# Patient Record
Sex: MALE | Race: WHITE | NOT HISPANIC OR LATINO | Employment: FULL TIME | URBAN - METROPOLITAN AREA
[De-identification: names, ages, dates, MRNs, and addresses within clinical notes are randomized per-mention and may not be internally consistent; named-entity substitution may affect disease eponyms.]

---

## 2018-10-18 ENCOUNTER — HISTORICAL (OUTPATIENT)
Dept: RADIOLOGY | Facility: HOSPITAL | Age: 33
End: 2018-10-18

## 2018-12-05 ENCOUNTER — HISTORICAL (OUTPATIENT)
Dept: ADMINISTRATIVE | Facility: HOSPITAL | Age: 33
End: 2018-12-05

## 2018-12-05 LAB
APTT PPP: 33.6 SECOND(S) (ref 24.8–36.9)
INR PPP: 1 (ref 0–1.3)
PROTHROMBIN TIME: 13.6 SECOND(S) (ref 12.2–14.7)

## 2019-04-02 ENCOUNTER — HISTORICAL (OUTPATIENT)
Dept: LAB | Facility: HOSPITAL | Age: 34
End: 2019-04-02

## 2019-04-02 LAB
ABS NEUT (OLG): 3.03 X10(3)/MCL (ref 2.1–9.2)
BASOPHILS # BLD AUTO: 0.1 X10(3)/MCL (ref 0–0.2)
BASOPHILS NFR BLD AUTO: 1 %
EOSINOPHIL # BLD AUTO: 0.2 X10(3)/MCL (ref 0–0.9)
EOSINOPHIL NFR BLD AUTO: 3 %
ERYTHROCYTE [DISTWIDTH] IN BLOOD BY AUTOMATED COUNT: 13.9 % (ref 11.5–17)
HCT VFR BLD AUTO: 48.1 % (ref 42–52)
HGB BLD-MCNC: 16 GM/DL (ref 14–18)
LYMPHOCYTES # BLD AUTO: 2.1 X10(3)/MCL (ref 0.6–4.6)
LYMPHOCYTES NFR BLD AUTO: 36 %
MAGNESIUM SERPL-MCNC: 2 MG/DL (ref 1.8–2.4)
MCH RBC QN AUTO: 28.7 PG (ref 27–31)
MCHC RBC AUTO-ENTMCNC: 33.3 GM/DL (ref 33–36)
MCV RBC AUTO: 86.4 FL (ref 80–94)
MONOCYTES # BLD AUTO: 0.4 X10(3)/MCL (ref 0.1–1.3)
MONOCYTES NFR BLD AUTO: 8 %
NEUTROPHILS # BLD AUTO: 3.03 X10(3)/MCL (ref 2.1–9.2)
NEUTROPHILS NFR BLD AUTO: 52 %
PLATELET # BLD AUTO: 252 X10(3)/MCL (ref 130–400)
PMV BLD AUTO: 9.8 FL (ref 9.4–12.4)
RBC # BLD AUTO: 5.57 X10(6)/MCL (ref 4.7–6.1)
VIT B12 SERPL-MCNC: 622 PG/ML (ref 193–986)
WBC # SPEC AUTO: 5.8 X10(3)/MCL (ref 4.5–11.5)

## 2021-03-15 ENCOUNTER — HISTORICAL (OUTPATIENT)
Dept: ADMINISTRATIVE | Facility: HOSPITAL | Age: 36
End: 2021-03-15

## 2021-03-15 LAB
CK MB SERPL-MCNC: 1.6 NG/ML
CK SERPL-CCNC: 135 UNIT/L (ref 21–232)
TROPONIN I SERPL-MCNC: <0.01 NG/ML (ref 0–0.04)

## 2021-08-20 ENCOUNTER — HISTORICAL (OUTPATIENT)
Dept: ADMINISTRATIVE | Facility: HOSPITAL | Age: 36
End: 2021-08-20

## 2021-08-20 LAB — SARS-COV-2 RNA RESP QL NAA+PROBE: NEGATIVE

## 2022-04-10 ENCOUNTER — HISTORICAL (OUTPATIENT)
Dept: ADMINISTRATIVE | Facility: HOSPITAL | Age: 37
End: 2022-04-10

## 2022-04-27 VITALS
SYSTOLIC BLOOD PRESSURE: 120 MMHG | BODY MASS INDEX: 28.97 KG/M2 | WEIGHT: 225.75 LBS | DIASTOLIC BLOOD PRESSURE: 82 MMHG | HEIGHT: 74 IN | OXYGEN SATURATION: 98 %

## 2022-05-03 NOTE — HISTORICAL [facility]
This is a historical note converted from Cm. Formatting and pictures may have been removed.  Please reference Cm for original formatting and attached multimedia. Chief Complaint  CHEST PAIN LEFT SIDED.  History of Present Illness  Patient is here today with complaints of left-sided?chest?discomfort/tightness?that started on Saturday.? He reports that he was loading?a hold into a truck?and went to throw it when he felt a sharp discomfort?in his?left?chest wall area.? He is felt this sharp discomfort in the past?and just repositioned?and through the hog into the truck.? Since that time?he has continued to have?chest tightness?possibly minimal?shortness of breath.? No palpitations. ?No history of?heart disease.? No change in?intensity of pain with movement or position.? No radiation to neck or arm.? He does have a history of reflux?but this is always felt?different?than current symptoms.  Review of Systems  GENERAL:?no? fatigue,  RESP:?+minimal ?SOB,? ?no?cough,?  CARDIAC:? ?+? chest pain as HPI,? ?no? palpations, nonexertional, no change with position or movement  GI:? ?no? N&V,? ?no? abd pain, + hx Gerd but usually feels different  NEURO:? ?no? headache,? ?no? dizziness  Physical Exam  Vitals & Measurements  T:?37.0? ?C (Oral)? HR:?62(Peripheral)? BP:?126/80?  HT:?187.00?cm? WT:?101.000?kg? BMI:?28.88?  General: NAD  HEENT: WNL  Chest CTAB, Minimal reproducible pain with palpation just superior to nipple  Cardiac RRR  ABD: soft NT/ND,  Assessment/Plan  Chest pain?R07.9  EKG-NSR, CXR, Cardiac enzymes-- ER precautions given--if workup negative then recommend Aleve 2 bid, if fails to improve in 1 week then?pt will call for referral to cardiology.  Ordered:  Clinic Follow-up PRN, 03/15/21 16:20:00 CDT, [facility] - [facility], Future Order  Creatine Kinase, Routine collect, 03/15/21 16:13:00 CDT, Blood, Stop date 03/15/21 16:13:00 CDT, Lab Collect, Chest pain, 03/15/21 16:13:00 CDT  Creatine Kinase MB, Timed collect,  03/15/21 16:04:00 CDT, Blood, Order for future visit, q6hr for 3 dose(s), Stop date 03/16/21 10:59:00 CDT, Lab Collect, Chest pain, 03/15/21 17:00:00 CDT  Office/Outpatient Visit Level 4 Established [id number] PC, Chest pain, [facility] - [facility], 03/15/21 16:20:00 CDT  Troponin-I, Timed collect, 03/15/21 16:04:00 CDT, Blood, Order for future visit, q6hr for 3 dose(s), Stop date 03/16/21 10:59:00 CDT, Lab Collect, Chest pain, 03/15/21 17:00:00 CDT  XR Chest 2 Views, Routine, 03/15/21 16:03:00 CDT, Other (please specify), None, Ambulatory, Rad Type, Chest pain, [facility], 03/15/21 16:03:00 CDT, Not Scheduled  ?  Referrals  Clinic Follow-up PRN, 03/15/21 16:20:00 CDT, [facility] - [facility], Future Order   Problem List/Past Medical History  Ongoing  ADD - Attention deficit disorder  Chronic GERD  Dysphagia  Morbid obesity  Right shoulder pain  Sinusitis  Type 1 von Willebrand disease  Historical  Allergy  Asthma  Hemorrhoids  VUR - Vesicoureteric reflux  Procedure/Surgical History  [place] teeth removed   Medications  Adderall 30 mg oral tablet, 30 mg= 1 tab(s), Oral, BID  Adderall 30 mg oral tablet, 30 mg= 1 tab(s), Oral, BID  Adderall 30 mg oral tablet, 30 mg= 1 tab(s), Oral, BID  omeprazole 40 mg oral DR capsule, 40 mg= 1 cap(s), Oral, Daily, 3 refills  Allergies  amoxicillin?(Rash)  Social History  Abuse/Neglect  No, 03/15/2021  No, 02/05/2021  No, 09/30/2020  No, 09/08/2020  No, 06/30/2020  Alcohol  Current, Beer, Liquor, 1-2 times per week, 02/27/2018  Employment/School  Employed, Previous employment/school: BUILDER ., 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  Home/Environment  Lives with Children., 05/18/2018  Tobacco  Smoker, current status unknown, N/A, 03/15/2021  Smoker, current status unknown, N/A, 02/05/2021  Smoker, current status unknown, Oral, No, 09/30/2020  Smoker, current status unknown, Oral, N/A, 09/08/2020  Smoker, current status unknown, Oral, N/A, 06/30/2020  Family History  Family history is  negative  Immunizations  Vaccine Date Status   influenza virus vaccine, inactivated 09/30/2020 Given   influenza virus vaccine, inactivated 11/26/2019 Given   influenza virus vaccine, inactivated 09/20/2018 Given   tetanus/diphth/pertuss (Tdap) adult/adol 03/23/2015 Recorded   Health Maintenance  Health Maintenance  ???Pending?(in the next year)  ??? ??OverDue  ??? ? ? ?Depression Screening due??12/19/19??and every 1??year(s)  ??? ? ? ?Influenza Vaccine due??10/01/20??and every 1??day(s)  ??? ? ? ?Smoking Cessation due??01/01/21??Variable frequency  ??? ? ? ?Alcohol Misuse Screening due??01/02/21??and every 1??year(s)  ??? ??Due?  ??? ? ? ?ADL Screening due??03/15/21??and every 1??year(s)  ??? ??Due In Future?  ??? ? ? ?Obesity Screening not due until??01/01/22??and every 1??year(s)  ???Satisfied?(in the past 1 year)  ??? ??Satisfied?  ??? ? ? ?Blood Pressure Screening on??03/15/21.??Satisfied by Lizbeth Martinez CMA  ??? ? ? ?Body Mass Index Check on??03/15/21.??Satisfied by Lizbeth Matrinez CMA  ??? ? ? ?Influenza Vaccine on??09/30/20.??Satisfied by Lizbeth Martinez CMA  ??? ? ? ?Obesity Screening on??03/15/21.??Satisfied by Lizbeth Martinez CMA  ?

## 2022-05-04 ENCOUNTER — HOSPITAL ENCOUNTER (OUTPATIENT)
Dept: RADIOLOGY | Facility: HOSPITAL | Age: 37
Discharge: HOME | End: 2022-05-04
Attending: OTOLARYNGOLOGY
Payer: COMMERCIAL

## 2022-05-04 DIAGNOSIS — J34.9 SINUS DISORDER: ICD-10-CM

## 2022-05-04 DIAGNOSIS — Z01.818 OTHER SPECIFIED PRE-OPERATIVE EXAMINATION: Primary | ICD-10-CM

## 2022-05-04 PROCEDURE — 71046 X-RAY EXAM CHEST 2 VIEWS: CPT | Mod: TC

## 2022-05-11 ENCOUNTER — ANESTHESIA EVENT (OUTPATIENT)
Dept: SURGERY | Facility: HOSPITAL | Age: 37
End: 2022-05-11
Payer: COMMERCIAL

## 2022-05-11 RX ORDER — AMPHETAMINE 18.8 MG/1
TABLET, ORALLY DISINTEGRATING ORAL DAILY
COMMUNITY

## 2022-05-11 RX ORDER — OMEPRAZOLE 20 MG/1
20 CAPSULE, DELAYED RELEASE ORAL DAILY
COMMUNITY
End: 2022-12-12 | Stop reason: SDUPTHER

## 2022-05-12 ENCOUNTER — HOSPITAL ENCOUNTER (OUTPATIENT)
Facility: HOSPITAL | Age: 37
Discharge: HOME | End: 2022-05-12
Attending: OTOLARYNGOLOGY | Admitting: OTOLARYNGOLOGY
Payer: COMMERCIAL

## 2022-05-12 ENCOUNTER — ANESTHESIA (OUTPATIENT)
Dept: SURGERY | Facility: HOSPITAL | Age: 37
End: 2022-05-12
Payer: COMMERCIAL

## 2022-05-12 VITALS
RESPIRATION RATE: 18 BRPM | BODY MASS INDEX: 27.55 KG/M2 | HEIGHT: 73 IN | WEIGHT: 207.88 LBS | TEMPERATURE: 98 F | SYSTOLIC BLOOD PRESSURE: 134 MMHG | HEART RATE: 99 BPM | DIASTOLIC BLOOD PRESSURE: 75 MMHG | OXYGEN SATURATION: 98 %

## 2022-05-12 DIAGNOSIS — J32.8 OTHER CHRONIC SINUSITIS: ICD-10-CM

## 2022-05-12 LAB
GROUP & RH: NORMAL
INDIRECT COOMBS GEL: NORMAL

## 2022-05-12 PROCEDURE — 63600175 PHARM REV CODE 636 W HCPCS

## 2022-05-12 PROCEDURE — 71000033 HC RECOVERY, INTIAL HOUR: Performed by: OTOLARYNGOLOGY

## 2022-05-12 PROCEDURE — 37000009 HC ANESTHESIA EA ADD 15 MINS: Performed by: OTOLARYNGOLOGY

## 2022-05-12 PROCEDURE — 63600175 PHARM REV CODE 636 W HCPCS: Mod: JG | Performed by: OTOLARYNGOLOGY

## 2022-05-12 PROCEDURE — 25000003 PHARM REV CODE 250: Performed by: NURSE ANESTHETIST, CERTIFIED REGISTERED

## 2022-05-12 PROCEDURE — 36000708 HC OR TIME LEV III 1ST 15 MIN: Performed by: OTOLARYNGOLOGY

## 2022-05-12 PROCEDURE — 71000016 HC POSTOP RECOV ADDL HR: Performed by: OTOLARYNGOLOGY

## 2022-05-12 PROCEDURE — 86850 RBC ANTIBODY SCREEN: CPT | Performed by: OTOLARYNGOLOGY

## 2022-05-12 PROCEDURE — 25000003 PHARM REV CODE 250: Performed by: ANESTHESIOLOGY

## 2022-05-12 PROCEDURE — 25000003 PHARM REV CODE 250: Performed by: OTOLARYNGOLOGY

## 2022-05-12 PROCEDURE — 63600175 PHARM REV CODE 636 W HCPCS: Performed by: NURSE ANESTHETIST, CERTIFIED REGISTERED

## 2022-05-12 PROCEDURE — 37000008 HC ANESTHESIA 1ST 15 MINUTES: Performed by: OTOLARYNGOLOGY

## 2022-05-12 PROCEDURE — 25000003 PHARM REV CODE 250

## 2022-05-12 PROCEDURE — 71000015 HC POSTOP RECOV 1ST HR: Performed by: OTOLARYNGOLOGY

## 2022-05-12 PROCEDURE — 36000709 HC OR TIME LEV III EA ADD 15 MIN: Performed by: OTOLARYNGOLOGY

## 2022-05-12 PROCEDURE — 63600175 PHARM REV CODE 636 W HCPCS: Performed by: ANESTHESIOLOGY

## 2022-05-12 RX ORDER — ONDANSETRON 2 MG/ML
4 INJECTION INTRAMUSCULAR; INTRAVENOUS EVERY 4 HOURS PRN
Status: DISCONTINUED | OUTPATIENT
Start: 2022-05-12 | End: 2022-05-12 | Stop reason: HOSPADM

## 2022-05-12 RX ORDER — ONDANSETRON 2 MG/ML
4 INJECTION INTRAMUSCULAR; INTRAVENOUS DAILY PRN
Status: DISCONTINUED | OUTPATIENT
Start: 2022-05-12 | End: 2022-05-12 | Stop reason: HOSPADM

## 2022-05-12 RX ORDER — HYDROCODONE BITARTRATE AND ACETAMINOPHEN 5; 325 MG/1; MG/1
1 TABLET ORAL EVERY 4 HOURS PRN
Status: DISCONTINUED | OUTPATIENT
Start: 2022-05-12 | End: 2022-05-12 | Stop reason: HOSPADM

## 2022-05-12 RX ORDER — PROPOFOL 10 MG/ML
VIAL (ML) INTRAVENOUS
Status: DISCONTINUED | OUTPATIENT
Start: 2022-05-12 | End: 2022-05-12

## 2022-05-12 RX ORDER — TRAMADOL HYDROCHLORIDE 50 MG/1
50 TABLET ORAL
Status: CANCELLED | OUTPATIENT
Start: 2022-05-12 | End: 2022-05-12

## 2022-05-12 RX ORDER — MIDAZOLAM HYDROCHLORIDE 1 MG/ML
INJECTION INTRAMUSCULAR; INTRAVENOUS
Status: COMPLETED
Start: 2022-05-12 | End: 2022-05-12

## 2022-05-12 RX ORDER — SODIUM CHLORIDE, SODIUM LACTATE, POTASSIUM CHLORIDE, CALCIUM CHLORIDE 600; 310; 30; 20 MG/100ML; MG/100ML; MG/100ML; MG/100ML
INJECTION, SOLUTION INTRAVENOUS CONTINUOUS
Status: DISCONTINUED | OUTPATIENT
Start: 2022-05-12 | End: 2022-05-12 | Stop reason: HOSPADM

## 2022-05-12 RX ORDER — SODIUM CHLORIDE 0.9 % (FLUSH) 0.9 %
10 SYRINGE (ML) INJECTION
Status: CANCELLED | OUTPATIENT
Start: 2022-05-12

## 2022-05-12 RX ORDER — HYDRALAZINE HYDROCHLORIDE 20 MG/ML
INJECTION INTRAMUSCULAR; INTRAVENOUS
Status: COMPLETED
Start: 2022-05-12 | End: 2022-05-12

## 2022-05-12 RX ORDER — ACETAMINOPHEN 500 MG
1000 TABLET ORAL
Status: COMPLETED | OUTPATIENT
Start: 2022-05-12 | End: 2022-05-12

## 2022-05-12 RX ORDER — GABAPENTIN 300 MG/1
300 CAPSULE ORAL
Status: COMPLETED | OUTPATIENT
Start: 2022-05-12 | End: 2022-05-12

## 2022-05-12 RX ORDER — ONDANSETRON 4 MG/1
4 TABLET, ORALLY DISINTEGRATING ORAL
Status: CANCELLED | OUTPATIENT
Start: 2022-05-12 | End: 2022-05-12

## 2022-05-12 RX ORDER — SILVER NITRATE 38.21; 12.74 MG/1; MG/1
STICK TOPICAL
Status: DISCONTINUED
Start: 2022-05-12 | End: 2022-05-12 | Stop reason: HOSPADM

## 2022-05-12 RX ORDER — HYDROCODONE BITARTRATE AND ACETAMINOPHEN 5; 300 MG/1; MG/1
1 TABLET ORAL EVERY 4 HOURS PRN
COMMUNITY
End: 2023-06-28

## 2022-05-12 RX ORDER — HALOPERIDOL 5 MG/ML
0.5 INJECTION INTRAMUSCULAR EVERY 10 MIN PRN
Status: DISCONTINUED | OUTPATIENT
Start: 2022-05-12 | End: 2022-05-12

## 2022-05-12 RX ORDER — SODIUM CHLORIDE, SODIUM GLUCONATE, SODIUM ACETATE, POTASSIUM CHLORIDE AND MAGNESIUM CHLORIDE 30; 37; 368; 526; 502 MG/100ML; MG/100ML; MG/100ML; MG/100ML; MG/100ML
1000 INJECTION, SOLUTION INTRAVENOUS CONTINUOUS
Status: DISCONTINUED | OUTPATIENT
Start: 2022-05-12 | End: 2022-05-12 | Stop reason: HOSPADM

## 2022-05-12 RX ORDER — MEPERIDINE HYDROCHLORIDE 50 MG/ML
75 INJECTION INTRAMUSCULAR; INTRAVENOUS; SUBCUTANEOUS EVERY 4 HOURS PRN
Status: DISCONTINUED | OUTPATIENT
Start: 2022-05-12 | End: 2022-05-12 | Stop reason: HOSPADM

## 2022-05-12 RX ORDER — OXYMETAZOLINE HCL 0.05 %
2 SPRAY, NON-AEROSOL (ML) NASAL
Status: COMPLETED | OUTPATIENT
Start: 2022-05-12 | End: 2022-05-12

## 2022-05-12 RX ORDER — LABETALOL HYDROCHLORIDE 5 MG/ML
INJECTION, SOLUTION INTRAVENOUS
Status: COMPLETED
Start: 2022-05-12 | End: 2022-05-12

## 2022-05-12 RX ORDER — CELECOXIB 200 MG/1
200 CAPSULE ORAL
Status: COMPLETED | OUTPATIENT
Start: 2022-05-12 | End: 2022-05-12

## 2022-05-12 RX ORDER — HALOPERIDOL 5 MG/ML
0.5 INJECTION INTRAMUSCULAR EVERY 10 MIN PRN
Status: DISCONTINUED | OUTPATIENT
Start: 2022-05-12 | End: 2022-05-12 | Stop reason: HOSPADM

## 2022-05-12 RX ORDER — LIDOCAINE HYDROCHLORIDE AND EPINEPHRINE 5; 5 MG/ML; UG/ML
INJECTION, SOLUTION INFILTRATION; PERINEURAL
Status: DISCONTINUED
Start: 2022-05-12 | End: 2022-05-12 | Stop reason: HOSPADM

## 2022-05-12 RX ORDER — SODIUM CHLORIDE 0.9 G/100ML
IRRIGANT IRRIGATION
Status: DISCONTINUED | OUTPATIENT
Start: 2022-05-12 | End: 2022-05-12 | Stop reason: HOSPADM

## 2022-05-12 RX ORDER — MEPERIDINE HYDROCHLORIDE 50 MG/ML
50 INJECTION INTRAMUSCULAR; INTRAVENOUS; SUBCUTANEOUS EVERY 4 HOURS PRN
Status: DISCONTINUED | OUTPATIENT
Start: 2022-05-12 | End: 2022-05-12 | Stop reason: HOSPADM

## 2022-05-12 RX ORDER — HYDROMORPHONE HYDROCHLORIDE 2 MG/ML
0.4 INJECTION, SOLUTION INTRAMUSCULAR; INTRAVENOUS; SUBCUTANEOUS EVERY 5 MIN PRN
Status: DISCONTINUED | OUTPATIENT
Start: 2022-05-12 | End: 2022-05-12 | Stop reason: HOSPADM

## 2022-05-12 RX ORDER — DEXMEDETOMIDINE HYDROCHLORIDE 100 UG/ML
INJECTION, SOLUTION INTRAVENOUS
Status: DISCONTINUED | OUTPATIENT
Start: 2022-05-12 | End: 2022-05-12

## 2022-05-12 RX ORDER — DEXAMETHASONE SODIUM PHOSPHATE 4 MG/ML
INJECTION, SOLUTION INTRA-ARTICULAR; INTRALESIONAL; INTRAMUSCULAR; INTRAVENOUS; SOFT TISSUE
Status: DISCONTINUED | OUTPATIENT
Start: 2022-05-12 | End: 2022-05-12

## 2022-05-12 RX ORDER — SODIUM CHLORIDE, SODIUM GLUCONATE, SODIUM ACETATE, POTASSIUM CHLORIDE AND MAGNESIUM CHLORIDE 30; 37; 368; 526; 502 MG/100ML; MG/100ML; MG/100ML; MG/100ML; MG/100ML
1000 INJECTION, SOLUTION INTRAVENOUS CONTINUOUS
Status: DISCONTINUED | OUTPATIENT
Start: 2022-05-12 | End: 2022-05-12

## 2022-05-12 RX ORDER — ONDANSETRON 4 MG/1
4 TABLET, ORALLY DISINTEGRATING ORAL
Status: COMPLETED | OUTPATIENT
Start: 2022-05-12 | End: 2022-05-12

## 2022-05-12 RX ORDER — DESMOPRESSIN ACETATE 4 UG/ML
INJECTION, SOLUTION INTRAVENOUS; SUBCUTANEOUS
Status: DISCONTINUED
Start: 2022-05-12 | End: 2022-05-12 | Stop reason: HOSPADM

## 2022-05-12 RX ORDER — ACETAMINOPHEN 500 MG
1000 TABLET ORAL
Status: CANCELLED | OUTPATIENT
Start: 2022-05-12 | End: 2022-05-12

## 2022-05-12 RX ORDER — ROCURONIUM BROMIDE 10 MG/ML
INJECTION, SOLUTION INTRAVENOUS
Status: DISCONTINUED | OUTPATIENT
Start: 2022-05-12 | End: 2022-05-12

## 2022-05-12 RX ORDER — FENTANYL CITRATE 50 UG/ML
INJECTION, SOLUTION INTRAMUSCULAR; INTRAVENOUS
Status: DISCONTINUED | OUTPATIENT
Start: 2022-05-12 | End: 2022-05-12

## 2022-05-12 RX ORDER — LABETALOL HYDROCHLORIDE 5 MG/ML
15 INJECTION, SOLUTION INTRAVENOUS ONCE
Status: COMPLETED | OUTPATIENT
Start: 2022-05-12 | End: 2022-05-12

## 2022-05-12 RX ORDER — LIDOCAINE HYDROCHLORIDE 10 MG/ML
1 INJECTION, SOLUTION EPIDURAL; INFILTRATION; INTRACAUDAL; PERINEURAL ONCE
Status: COMPLETED | OUTPATIENT
Start: 2022-05-12 | End: 2022-05-12

## 2022-05-12 RX ORDER — LIDOCAINE HYDROCHLORIDE 10 MG/ML
1 INJECTION, SOLUTION EPIDURAL; INFILTRATION; INTRACAUDAL; PERINEURAL ONCE
Status: CANCELLED | OUTPATIENT
Start: 2022-05-12 | End: 2022-05-12

## 2022-05-12 RX ORDER — CEFDINIR 300 MG/1
300 CAPSULE ORAL 2 TIMES DAILY
COMMUNITY
Start: 2022-05-10 | End: 2022-05-20

## 2022-05-12 RX ORDER — LEVOFLOXACIN 5 MG/ML
500 INJECTION, SOLUTION INTRAVENOUS
Status: COMPLETED | OUTPATIENT
Start: 2022-05-12 | End: 2022-05-12

## 2022-05-12 RX ORDER — SILVER NITRATE 38.21; 12.74 MG/1; MG/1
STICK TOPICAL
Status: DISCONTINUED | OUTPATIENT
Start: 2022-05-12 | End: 2022-05-12 | Stop reason: HOSPADM

## 2022-05-12 RX ORDER — DIAZEPAM 5 MG/1
10 TABLET ORAL
Status: COMPLETED | OUTPATIENT
Start: 2022-05-12 | End: 2022-05-12

## 2022-05-12 RX ORDER — COCAINE HYDROCHLORIDE 40 MG/ML
SOLUTION NASAL
Status: DISCONTINUED
Start: 2022-05-12 | End: 2022-05-12 | Stop reason: HOSPADM

## 2022-05-12 RX ORDER — DESMOPRESSIN ACETATE 0.1 MG/ML
2 SOLUTION NASAL 2 TIMES DAILY
COMMUNITY
Start: 2022-05-10 | End: 2023-06-28

## 2022-05-12 RX ORDER — BUPIVACAINE HYDROCHLORIDE AND EPINEPHRINE 5; 5 MG/ML; UG/ML
INJECTION, SOLUTION EPIDURAL; INTRACAUDAL; PERINEURAL
Status: DISCONTINUED | OUTPATIENT
Start: 2022-05-12 | End: 2022-05-12 | Stop reason: HOSPADM

## 2022-05-12 RX ORDER — HYDRALAZINE HYDROCHLORIDE 20 MG/ML
10 INJECTION INTRAMUSCULAR; INTRAVENOUS
Status: DISCONTINUED | OUTPATIENT
Start: 2022-05-12 | End: 2022-05-12 | Stop reason: HOSPADM

## 2022-05-12 RX ORDER — ONDANSETRON 2 MG/ML
4 INJECTION INTRAMUSCULAR; INTRAVENOUS DAILY PRN
Status: DISCONTINUED | OUTPATIENT
Start: 2022-05-12 | End: 2022-05-12

## 2022-05-12 RX ORDER — METHYLPREDNISOLONE 4 MG/1
4 TABLET ORAL
COMMUNITY
Start: 2022-05-10 | End: 2023-06-28

## 2022-05-12 RX ORDER — GABAPENTIN 300 MG/1
300 CAPSULE ORAL
Status: CANCELLED | OUTPATIENT
Start: 2022-05-12 | End: 2022-05-12

## 2022-05-12 RX ORDER — SODIUM CHLORIDE, SODIUM GLUCONATE, SODIUM ACETATE, POTASSIUM CHLORIDE AND MAGNESIUM CHLORIDE 30; 37; 368; 526; 502 MG/100ML; MG/100ML; MG/100ML; MG/100ML; MG/100ML
1000 INJECTION, SOLUTION INTRAVENOUS CONTINUOUS
Status: CANCELLED | OUTPATIENT
Start: 2022-05-12 | End: 2022-06-11

## 2022-05-12 RX ORDER — METHOCARBAMOL 100 MG/ML
INJECTION, SOLUTION INTRAMUSCULAR; INTRAVENOUS
Status: DISCONTINUED
Start: 2022-05-12 | End: 2022-05-12 | Stop reason: HOSPADM

## 2022-05-12 RX ADMIN — HYDROCODONE BITARTRATE AND ACETAMINOPHEN 1 TABLET: 5; 325 TABLET ORAL at 03:05

## 2022-05-12 RX ADMIN — HYDROMORPHONE HYDROCHLORIDE 0.4 MG: 2 INJECTION INTRAMUSCULAR; INTRAVENOUS; SUBCUTANEOUS at 10:05

## 2022-05-12 RX ADMIN — LIDOCAINE HYDROCHLORIDE 4 ML: 10 INJECTION, SOLUTION EPIDURAL; INFILTRATION; INTRACAUDAL; PERINEURAL at 09:05

## 2022-05-12 RX ADMIN — DIAZEPAM 10 MG: 5 TABLET ORAL at 08:05

## 2022-05-12 RX ADMIN — ACETAMINOPHEN 1000 MG: 500 TABLET ORAL at 08:05

## 2022-05-12 RX ADMIN — SODIUM CHLORIDE, SODIUM LACTATE, POTASSIUM CHLORIDE, AND CALCIUM CHLORIDE: .6; .31; .03; .02 INJECTION, SOLUTION INTRAVENOUS at 10:05

## 2022-05-12 RX ADMIN — METHOCARBAMOL 1000 MG: 100 INJECTION INTRAMUSCULAR; INTRAVENOUS at 10:05

## 2022-05-12 RX ADMIN — LABETALOL HYDROCHLORIDE 15 MG: 5 INJECTION INTRAVENOUS at 10:05

## 2022-05-12 RX ADMIN — PROPOFOL 200 MG: 10 INJECTION, EMULSION INTRAVENOUS at 09:05

## 2022-05-12 RX ADMIN — HYDRALAZINE HYDROCHLORIDE 10 MG: 20 INJECTION INTRAMUSCULAR; INTRAVENOUS at 10:05

## 2022-05-12 RX ADMIN — Medication 2 SPRAY: at 08:05

## 2022-05-12 RX ADMIN — DEXAMETHASONE SODIUM PHOSPHATE 12 MG: 4 INJECTION, SOLUTION INTRA-ARTICULAR; INTRALESIONAL; INTRAMUSCULAR; INTRAVENOUS; SOFT TISSUE at 09:05

## 2022-05-12 RX ADMIN — ROCURONIUM BROMIDE 50 MG: 10 SOLUTION INTRAVENOUS at 09:05

## 2022-05-12 RX ADMIN — MIDAZOLAM HYDROCHLORIDE 2 MG: 1 INJECTION, SOLUTION INTRAMUSCULAR; INTRAVENOUS at 09:05

## 2022-05-12 RX ADMIN — DEXMEDETOMIDINE HYDROCHLORIDE 6 MCG: 100 INJECTION, SOLUTION INTRAVENOUS at 09:05

## 2022-05-12 RX ADMIN — LABETALOL HYDROCHLORIDE 15 MG: 5 INJECTION, SOLUTION INTRAVENOUS at 10:05

## 2022-05-12 RX ADMIN — SODIUM CHLORIDE, SODIUM LACTATE, POTASSIUM CHLORIDE, AND CALCIUM CHLORIDE: .6; .31; .03; .02 INJECTION, SOLUTION INTRAVENOUS at 08:05

## 2022-05-12 RX ADMIN — LEVOFLOXACIN 500 MG: 5 INJECTION, SOLUTION INTRAVENOUS at 08:05

## 2022-05-12 RX ADMIN — CELECOXIB 200 MG: 200 CAPSULE ORAL at 08:05

## 2022-05-12 RX ADMIN — ONDANSETRON 4 MG: 4 TABLET, ORALLY DISINTEGRATING ORAL at 08:05

## 2022-05-12 RX ADMIN — HYDRALAZINE HYDROCHLORIDE 10 MG: 20 INJECTION, SOLUTION INTRAMUSCULAR; INTRAVENOUS at 10:05

## 2022-05-12 RX ADMIN — GABAPENTIN 300 MG: 300 CAPSULE ORAL at 08:05

## 2022-05-12 RX ADMIN — FENTANYL CITRATE 100 MCG: 50 INJECTION, SOLUTION INTRAMUSCULAR; INTRAVENOUS at 09:05

## 2022-05-12 RX ADMIN — DESMOPRESSIN ACETATE 20 MCG: 4 INJECTION INTRAVENOUS; SUBCUTANEOUS at 08:05

## 2022-05-12 NOTE — ANESTHESIA PREPROCEDURE EVALUATION
05/12/2022  Dinesh Vázquez is a 36 y.o., male with Past Medical History:  Attention-deficit hyperactivity disorder, unspecified type  Chronic sinus infection  Chronic sinusitis, unspecified  Gastric reflux  Von Willebrand disease, type I  Here for FESS.  No clinical issues with abnormal bleeding      Pre-op Assessment          Review of Systems         Anesthesia Plan  Type of Anesthesia, risks & benefits discussed:    Anesthesia Type: Gen ETT  Intra-op Monitoring Plan: Standard ASA Monitors  Post Op Pain Control Plan: multimodal analgesia and IV/PO Opioids PRN  Induction:  IV  Airway Plan: Direct, Post-Induction  Informed Consent: Informed consent signed with the Patient and all parties understand the risks and agree with anesthesia plan.  All questions answered.   ASA Score: 2  Day of Surgery Review of History & Physical: H&P Update referred to the surgeon/provider.  Anesthesia Plan Notes: Premedication: Midazolam  Special Technique: Preemptive analgesia with neurontin, zofran, [id number]    DDAVP 0.3mcg/kg ordered preop over 15min in holding for prevention of bleeding - will also be sent home with it per Dr. Antony    Ready For Surgery From Anesthesia Perspective.     .

## 2022-05-12 NOTE — ANESTHESIA PROCEDURE NOTES
Intubation    Date/Time: 5/12/2022 9:15 AM  Performed by: Darlyn Ann CRNA  Authorized by: Maribell Martinez MD     Intubation:     Induction:  Intravenous    Intubated:  Postinduction    Mask Ventilation:  Easy mask    Attempts:  1    Attempted By:  CRNA    Method of Intubation:  Direct    Blade:  Bernal 2    Laryngeal View Grade: Grade I - full view of cords      Difficult Airway Encountered?: No      Complications:  None    Airway Device:  Oral endotracheal tube    Airway Device Size:  7.5    Style/Cuff Inflation:  Cuffed (inflated to minimal occlusive pressure)    Inflation Amount (mL):  6    Tube secured:  21    Secured at:  The lips    Placement Verified By:  Capnometry    Complicating Factors:  None    Findings Post-Intubation:  BS equal bilateral

## 2022-05-12 NOTE — DISCHARGE INSTRUCTIONS
Endoscopic Sinus Surgery Discharge Instructions     About this topic   Endoscopic sinus surgery can correct problems with your sinuses when drugs do not help. The sinuses are air-filled spaces inside the head that lie behind your forehead, nose, cheeks, and eyes. The spaces have small hairs that clean the sinuses. Your sinuses may swell, get inflamed or infected if the small hairs are not working well. You can also have problems if there is a block in the opening to the sinuses. The mucus sometimes gets trapped inside the sinus, causing pain.     What care is needed at home?   Do not blow your nose or sneeze for 7 to 10 days. If you must sneeze, keep your mouth open.  Breathe through your mouth for the first few days.  Place an ice pack wrapped in a towel over the painful part. Never put ice right on the skin. Do not leave the ice on more than 10 to 15 minutes at a time. This will help relieve pain and swelling.  Protect your nose from injury.  Avoid activities that put pressure on your face, like bending over or holding your breath.  You can take a shower but make sure it's not too hot. You can use a saline rinse for your nose. This may be right after any packing has been removed. You may need to use the rinse 3 to 4 times each day to help prevent crusts from forming inside of your nose.  You can use a humidifier.  Ask the doctor when you can return to your normal activities or return to work.  Sleeping with your head up or in a sitting position may be more comfortable.    What follow-up care is needed?   Keep your scheduled follow up appointment.   Your doctor may remove any packing or stents (small tubes) at one of these visits. Follow the instructions your doctor told you.    What drugs may be needed?   The doctor may order drugs to:  Help with pain  Fight an infection  Lessen swelling    Will physical activity be limited?   Talk with your doctor about the right amount of activity for you.  Do not lift anything  over 10 pounds (4.5 kg) for 2 to 4 weeks.  Do not bend over toward the floor.  Avoid activities that may jerk your head, like playing sports.    What problems could happen?   Infection  Bleeding  Numbness in the nose  Eye problems  Bruising around eyes  Cerebrospinal fluid leaks from nose    When do I need to call the doctor?   Signs of infection. These include a fever of 100.4°F (38°C) or higher, chills.  Lots of bleeding from your nose  Packing comes out before it should  Upset stomach and throwing up not helped with drugs  Too much pain or headache  Swelling or redness of the eyes or nose  Clear fluid draining from one side of your nose  Cough, shortness of breath, chest pain  You are not feeling better in 2 to 3 days or you are feeling worse     FOLLOW DR OROURKE'S  DISCHARGE INSTRUCTIONS     KEEP YOUR FOLLOW UP APPOINTMENT     YOU MAY SHOWER, WARM WATER STARTING TODAY     CALL WITH ANY QUESTIONS

## 2022-05-12 NOTE — ANESTHESIA POSTPROCEDURE EVALUATION
Anesthesia Post Evaluation    Patient: Dinesh Vázquez    Procedure(s) Performed: Procedure(s) (LRB):  FESS (FUNCTIONAL ENDOSCOPIC SINUS SURGERY) FESS, Septoplasty, SMR Bilateral Turbinates (Bilateral)  SEPTOPLASTY, NOSE (Bilateral)  EXCISION, NASAL TURBINATE, SUBMUCOSAL (Bilateral)    Final Anesthesia Type: general      Patient location during evaluation: PACU  Patient participation: Yes- Able to Participate  Level of consciousness: awake and alert  Post-procedure vital signs: reviewed and stable  Pain management: adequate    PONV status at discharge: No PONV  Anesthetic complications: no      Cardiovascular status: hemodynamically stable  Respiratory status: unassisted and room air  Hydration status: euvolemic  Follow-up not needed.          Vitals Value Taken Time   /87 05/12/22 1125   Temp 36.5 °C (97.7 °F) 05/12/22 1125   Pulse 98 05/12/22 1125   Resp 18 05/12/22 1125   SpO2 99 % 05/12/22 1125         Event Time   Out of Recovery 11:19:00         Pain/Edil Score: Pain Rating Prior to Med Admin: 6 (5/12/2022 11:19 AM)  Pain Rating Post Med Admin: 3 (5/12/2022 11:19 AM)  Edil Score: 10 (5/12/2022 11:25 AM)

## 2022-05-12 NOTE — PLAN OF CARE
Pt. Resting comfortably, VSS, no s/s distress, Edil at acceptable level for transfer to phase II, Criteria met for anesthesia release per Dr. Maribell Martinez

## 2022-05-12 NOTE — TRANSFER OF CARE
"Anesthesia Transfer of Care Note    Patient: Dinesh Vázquez    Procedure(s) Performed: Procedure(s) (LRB):  FESS (FUNCTIONAL ENDOSCOPIC SINUS SURGERY) FESS, Septoplasty, SMR Bilateral Turbinates (Bilateral)  SEPTOPLASTY, NOSE (Bilateral)  EXCISION, NASAL TURBINATE, SUBMUCOSAL (Bilateral)    Patient location: PACU    Anesthesia Type: general    Transport from OR: Transported from OR on room air with adequate spontaneous ventilation    Post pain: adequate analgesia    Post assessment: no apparent anesthetic complications    Post vital signs: stable    Level of consciousness: lethargic    Nausea/Vomiting: no nausea/vomiting    Complications: none    Transfer of care protocol was followed      Last vitals:   Visit Vitals  /86   Pulse 86   Temp 37.1 °C (98.7 °F) (Oral)   Resp 16   Ht 6' 1" (1.854 m)   Wt 94.3 kg (207 lb 14.4 oz)   SpO2 98%   BMI 27.43 kg/m²     "

## 2022-05-18 LAB
ESTROGEN SERPL-MCNC: NORMAL PG/ML
INSULIN SERPL-ACNC: NORMAL U[IU]/ML
LAB AP CLINICAL INFORMATION: NORMAL
LAB AP GROSS DESCRIPTION: NORMAL
LAB AP REPORT FOOTNOTES: NORMAL
T3RU NFR SERPL: NORMAL %

## 2022-07-19 NOTE — OP NOTE
[facility] [address] [place] [address]    PATIENT NAME:      CK VO  YOB: 1985  CSN:               [id number]  MRN:               [id number]  ADMIT DATE:        05/12/2022 07:21:00  PHYSICIAN:         Paul Antony                          OPERATIVE REPORT      DATE OF SURGERY:    05/12/2022 00:00:00    SURGEON:  Paul Antony    PREOPERATIVE DIAGNOSIS:  Chronic recurrent pansinusitis, intranasal and   intrasinus polyposis, nasal septal deviation, turbinate hypertrophy, diffuse   mucosal antral disease throughout the paranasal sinuses.    OPERATIVE PROCEDURE:  Bilateral anterior and posterior ethmoidectomy, maxillary   antrostomy, septoplasty, turbinoplasty, frontal sinus exploration,   sphenoidotomy, submucosal resection of inferior turbinates.    DESCRIPTION OF PROCEDURE:  With proper consent and information, the patient was   brought to the operating room, placed on the operating table in supine position.    After satisfactory endotracheal intubation and general anesthesia, the patient   was placed to sleep.  The nose was packed with 200 mg of cocaine and regionally   anesthetized with 0.5 cc of 2% Xylocaine with epinephrine. The septoplasty was   done first.  A left hemitransfixion incision was made.  Mucoperichondrium was   elevated off both sides of the septum.  This was removed, leaving a caudal and   dorsal strut of approximately 1.5 cm. The deviation was very bad, it was   reapproximated with 4-0 plain catgut and 5-0 chromic.  Endoscopic sinus surgery   was done at that point.  Bilateral anterior and posterior ethmoidectomy was done   up to the fovea ethmoidalis.  There was a large amount of diffuse ethmoid   disease.  The frontal ethmoid sinus was opened.  There were some obstructive   changes and mucosal antral disease in this area as well.  Maxillary  antrostomy   was done with a backbiting forceps.  Identical procedure was carried out on the   opposite side.  He tolerated the procedure very well.  Nasopores were placed in   the nose and he was transferred back to the recovery room awake, alert, and   responsive.      ______________________________  Paul PEGUERO/CLIVE  DD:  07/19/2022  Time:  10:59AM  DT:  07/19/2022  Time:  02:58PM  Job #:  [id number]      OPERATIVE REPORT

## 2022-10-18 NOTE — DISCHARGE SUMMARY
[facility] [address] [place] [address]    PATIENT NAME:       BRYSON VÁZQUEZ   YOB: 1985  CSN:                [id number]   MRN:                [id number]  ADMIT DATE:         05/12/2022 00:00:00  PHYSICIAN:          Paul Antony                          DISCHARGE SUMMARY    DATE OF DISCHARGE:  05/12/2022 00:00:00    HOSPITAL COURSE:  Mr. Vázquez was admitted to the hospital for definitive surgical   treatment.  He underwent surgery without any intraoperative or postoperative   problems.  He has been discharged with strict postoperative instructions on how   to maintain and take care of his surgery site, and all the medicines were   described to him in detail.  He felt very comfortable on the discharge.  He was   stable on discharge.  He is to follow up in my office on a scheduled basis that   was given to him in the office.        ______________________________  Paul Antony    BJSALLIE/AQS  DD:  10/17/2022  Time:  02:43PM  DT:  10/17/2022  Time:  04:19PM  Job #:  [id number]      DISCHARGE SUMMARY

## 2023-06-27 PROBLEM — Z00.00 ENCOUNTER FOR WELLNESS EXAMINATION IN ADULT: Status: ACTIVE | Noted: 2023-06-27

## 2023-06-27 PROBLEM — K21.9 GASTROESOPHAGEAL REFLUX DISEASE: Status: ACTIVE | Noted: 2023-06-27

## 2023-06-27 PROBLEM — F90.0 ATTENTION DEFICIT HYPERACTIVITY DISORDER (ADHD), PREDOMINANTLY INATTENTIVE TYPE: Status: ACTIVE | Noted: 2023-06-27

## 2023-06-27 PROBLEM — D68.01 TYPE 1 VON WILLEBRAND DISEASE: Status: ACTIVE | Noted: 2023-06-27

## 2023-06-27 PROBLEM — Z23 IMMUNIZATION DUE: Status: ACTIVE | Noted: 2023-06-27

## 2023-06-28 PROCEDURE — 86803 HEPATITIS C AB TEST: CPT | Performed by: FAMILY MEDICINE

## 2023-10-02 PROBLEM — Z00.00 ENCOUNTER FOR WELLNESS EXAMINATION IN ADULT: Status: RESOLVED | Noted: 2023-06-27 | Resolved: 2023-10-02

## 2023-12-13 PROBLEM — R19.04 ABDOMINAL WALL MASS OF LEFT LOWER QUADRANT: Status: ACTIVE | Noted: 2023-12-13

## 2024-10-14 PROBLEM — Z00.00 ENCOUNTER FOR WELLNESS EXAMINATION IN ADULT: Status: RESOLVED | Noted: 2023-06-27 | Resolved: 2024-10-14

## 2025-02-06 ENCOUNTER — TELEPHONE (OUTPATIENT)
Dept: PHARMACY | Facility: CLINIC | Age: 40
End: 2025-02-06
Payer: COMMERCIAL

## 2025-02-06 NOTE — TELEPHONE ENCOUNTER
[facility]/Population Health Chart Review & Patient Outreach Details For Medication Adherence Project    Reason for Outreach Encounter: 3rd Party payor non-compliance report ([employer], [employer], [employer], etc)  2.  Patient Outreach Method: Reviewed patient chart  and [employer] message  3.   Medication in question:    Hyperlipidemia Medications               rosuvastatin (CRESTOR) 20 MG tablet Take 1 tablet (20 mg total) by mouth every evening.                  rosuvastatin  last filled  10/19/24 for 30 day supply      4.  Reviewed and or Updates Made To: Patient Chart  5. Outreach Outcomes and/or actions taken: Sent inquiry to patient: Waiting for response  Additional Notes:

## 2025-03-15 ENCOUNTER — OFFICE VISIT (OUTPATIENT)
Dept: URGENT CARE | Facility: CLINIC | Age: 40
End: 2025-03-15
Payer: COMMERCIAL

## 2025-03-15 VITALS
BODY MASS INDEX: 31.15 KG/M2 | HEIGHT: 72 IN | DIASTOLIC BLOOD PRESSURE: 84 MMHG | RESPIRATION RATE: 18 BRPM | TEMPERATURE: 97 F | OXYGEN SATURATION: 98 % | SYSTOLIC BLOOD PRESSURE: 119 MMHG | HEART RATE: 77 BPM | WEIGHT: 230 LBS

## 2025-03-15 DIAGNOSIS — J01.90 ACUTE SINUSITIS, RECURRENCE NOT SPECIFIED, UNSPECIFIED LOCATION: Primary | ICD-10-CM

## 2025-03-15 PROCEDURE — 96372 THER/PROPH/DIAG INJ SC/IM: CPT | Mod: ,,, | Performed by: PHYSICIAN ASSISTANT

## 2025-03-15 PROCEDURE — 99203 OFFICE O/P NEW LOW 30 MIN: CPT | Mod: 25,,, | Performed by: PHYSICIAN ASSISTANT

## 2025-03-15 RX ORDER — BETAMETHASONE SODIUM PHOSPHATE AND BETAMETHASONE ACETATE 3; 3 MG/ML; MG/ML
9 INJECTION, SUSPENSION INTRA-ARTICULAR; INTRALESIONAL; INTRAMUSCULAR; SOFT TISSUE
Status: COMPLETED | OUTPATIENT
Start: 2025-03-15 | End: 2025-03-15

## 2025-03-15 RX ORDER — DOXYCYCLINE 100 MG/1
100 CAPSULE ORAL EVERY 12 HOURS
Qty: 20 CAPSULE | Refills: 0 | Status: SHIPPED | OUTPATIENT
Start: 2025-03-15 | End: 2025-03-25

## 2025-03-15 RX ADMIN — BETAMETHASONE SODIUM PHOSPHATE AND BETAMETHASONE ACETATE 9 MG: 3; 3 INJECTION, SUSPENSION INTRA-ARTICULAR; INTRALESIONAL; INTRAMUSCULAR; SOFT TISSUE at 10:03

## 2025-03-15 NOTE — PROGRESS NOTES
Subjective:      Patient ID: Dinesh Vázquez is a 39 y.o. male.    Vitals:  height is 6' (1.829 m) and weight is 104.3 kg (230 lb). His temperature is 97.2 °F (36.2 °C). His blood pressure is 119/84 and his pulse is 77. His respiration is 18 and oxygen saturation is 98%.     Chief Complaint: Sinus Problem     Patient is a 39 y.o. male who presents to urgent care with complaints of sinus pressure, frontal headache, nasal congestion, cough x 2 wks.     ROS   Objective:     Physical Exam   Constitutional: He is oriented to person, place, and time. He appears well-developed. He is cooperative.  Non-toxic appearance. He does not appear ill. No distress.   HENT:   Head: Normocephalic and atraumatic.   Ears:   Right Ear: Hearing, tympanic membrane, external ear and ear canal normal.   Left Ear: Hearing, tympanic membrane, external ear and ear canal normal.   Nose: Nose normal. No nasal deformity. No epistaxis.   Mouth/Throat: Uvula is midline, oropharynx is clear and moist and mucous membranes are normal. No trismus in the jaw. Normal dentition. No uvula swelling. No oropharyngeal exudate, posterior oropharyngeal edema or posterior oropharyngeal erythema.   Eyes: Conjunctivae and lids are normal. No scleral icterus.   Neck: Trachea normal and phonation normal. Neck supple. No edema present. No erythema present. No neck rigidity present.   Cardiovascular: Normal rate, regular rhythm, normal heart sounds and normal pulses.   Pulmonary/Chest: Effort normal and breath sounds normal. No respiratory distress. He has no decreased breath sounds. He has no rhonchi.   Abdominal: Normal appearance.   Musculoskeletal: Normal range of motion.         General: No deformity. Normal range of motion.   Neurological: He is alert and oriented to person, place, and time. He exhibits normal muscle tone. Coordination normal.   Skin: Skin is warm, dry, intact, not diaphoretic and not pale.   Psychiatric: His speech is normal and behavior is  normal. Judgment and thought content normal.   Nursing note and vitals reviewed.       Previous History      Review of patient's allergies indicates:   Allergen Reactions    Penicillin g Rash    Amoxicillin Rash and Blisters       Past Medical History:   Diagnosis Date    Attention-deficit hyperactivity disorder, unspecified type     Chronic sinusitis, unspecified     Gastric reflux     Von Willebrand disease, type I      Current Outpatient Medications   Medication Instructions    amphetamine (ADZENYS XR-ODT) 18.8 mg TbLB Daily    AUVI-Q 0.3 mg/0.3 mL AtIn INJECT AS NEEDED FOR SEVERE ALLERGIC REACTION INCLUDING ANAPHYLAXIS AS DIRECTED INTO OUTER THIGH - MAY REPEAT IN 3-5 MINUTES IF NEEDED IN OP    doxycycline (MONODOX) 100 mg, Oral, Every 12 hours    omeprazole (PRILOSEC) 40 mg, Oral    rosuvastatin (CRESTOR) 20 mg, Oral, Nightly     Past Surgical History:   Procedure Laterality Date    COLONOSCOPY      FUNCTIONAL ENDOSCOPIC SINUS SURGERY (FESS) Bilateral 05/12/2022    Procedure: FESS (FUNCTIONAL ENDOSCOPIC SINUS SURGERY) FESS, Septoplasty, SMR Bilateral Turbinates;  Surgeon: Paul Antony MD;  Location: [facility] OR;  Service: ENT;  Laterality: Bilateral;    LOWER BACK SX.      L4-L5    NASAL SEPTOPLASTY Bilateral 05/12/2022    Procedure: SEPTOPLASTY, NOSE;  Surgeon: Paul Antony MD;  Location: [facility] OR;  Service: ENT;  Laterality: Bilateral;     Family History   Problem Relation Name Age of Onset    Breast cancer Mother Deepti Vázquez     Cancer Mother Deepti Vázquez     Atrial fibrillation Father Abhijeet Vázquez     Prostate cancer Father Abhijeet Vázquez     Diabetes Father Abhijeet Vázquez     Cancer Father Abhijeet Vázquez     No Known Problems Sister      Early death Brother Chan Vázquez 39        suicide       Social History[1]     Physical Exam      Vital Signs Reviewed   /84 (Patient Position: Sitting)   Pulse 77   Temp 97.2 °F (36.2 °C)   Resp 18   Ht 6' (1.829 m)   Wt 104.3 kg (230 lb)   SpO2 98%   BMI 31.19  kg/m²        Procedures    Procedures     Labs     Results for orders placed or performed in visit on 07/11/24   Bilirubin, Direct    Collection Time: 07/11/24  8:19 AM   Result Value Ref Range    Bilirubin Direct 0.1 0.0 - 0.5 mg/dL   Comprehensive Metabolic Panel    Collection Time: 07/11/24  8:19 AM   Result Value Ref Range    Sodium 137 136 - 145 mmol/L    Potassium 4.2 3.5 - 5.1 mmol/L    Chloride 99 98 - 107 mmol/L    CO2 30 21 - 32 mmol/L    Glucose 107 (H) 74 - 106 mg/dL    Blood Urea Nitrogen 14 7.0 - 18.0 mg/dL    Creatinine 1.03 0.60 - 1.30 mg/dL    Calcium 10.2 (H) 8.5 - 10.1 mg/dL    Protein Total 7.4 6.4 - 8.2 gm/dL    Albumin 5.0 3.4 - 5.0 g/dL    Globulin 2.4 1.2 - 3.0 gm/dL    Albumin/Globulin Ratio 2.1 (H) 1.5 - 2.0 ratio    Bilirubin Total 0.5 0.2 - 1.0 mg/dL    ALP 49 38 - 126 unit/L    ALT 17 7 - 52 unit/L    AST 16 15 - 37 unit/L    eGFR >60 mL/min/1.73/m2    BUN/Creatinine Ratio 14    Urinalysis    Collection Time: 07/11/24  8:19 AM   Result Value Ref Range    Color, UA Yellow Yellow, Light-Yellow, Dark Yellow, Kristy, Straw    Appearance, UA Clear Clear    Specific Gravity, UA >=1.030 1.005 - 1.030    pH, UA 5.5 5.0 - 8.5    Protein, UA Negative Negative    Glucose, UA Negative Negative, Normal    Ketones, UA Negative Negative    Blood, UA Negative Negative    Bilirubin, UA Negative Negative    Urobilinogen, UA 0.2 0.2, 1.0, Normal    Nitrites, UA Negative Negative    Leukocyte Esterase, UA Negative Negative   Lipid Panel    Collection Time: 07/11/24  8:19 AM   Result Value Ref Range    Cholesterol Total 295 (H) 0 - 200 mg/dL    HDL Cholesterol 48 35 - 60 mg/dL    Triglyceride 83 30 - 150 mg/dL    Cholesterol/HDL Ratio 6     Very Low Density Lipoprotein 17     LDL Cholesterol 230.00 (H) 0.00 - 129.00 mg/dL   TSH    Collection Time: 07/11/24  8:19 AM   Result Value Ref Range    TSH 1.153 0.350 - 4.940 uIU/mL   CBC with Differential    Collection Time: 07/11/24  8:19 AM   Result Value Ref Range     WBC 6.20 4.50 - 11.50 x10(3)/mcL    RBC 5.46 4.70 - 6.10 x10(6)/mcL    Hgb 15.8 14.0 - 18.0 g/dL    Hct 45.9 42.0 - 52.0 %    MCV 84.1 80.0 - 94.0 fL    MCH 28.9 27.0 - 31.0 pg    MCHC 34.4 33.0 - 36.0 g/dL    RDW 13.8 11.5 - 17.0 %    Platelet 297 130 - 400 x10(3)/mcL    MPV 8.7 7.4 - 10.4 fL    Neut % 52.9 %    Lymph % 38.2 %    Mono % 8.9 %    Lymph # 2.4 0.6 - 4.6 x10(3)/mcL    Neut # 3.2 2.1 - 9.2 x10(3)/mcL    Mono # 0.6 0.1 - 1.3 x10(3)/mcL   Urinalysis, Microscopic    Collection Time: 07/11/24  8:19 AM   Result Value Ref Range    Bacteria, UA None Seen None Seen, Rare, Occasional /HPF    RBC, UA None Seen None Seen, 0-2, 3-5, 0-5 /HPF    WBC, UA None Seen None Seen, 0-2, 3-5, 0-5 /HPF    Squamous Epithelial Cells, UA None Seen None Seen, Rare, Occasional, Occ /HPF   Hemoglobin A1C    Collection Time: 07/11/24  8:19 AM   Result Value Ref Range    Hemoglobin A1c 5.7 4.0 - 6.5 %    Estimated Average Glucose 116.9 mg/dL       Assessment:     1. Acute sinusitis, recurrence not specified, unspecified location        Plan:       Acute sinusitis, recurrence not specified, unspecified location    Other orders  -     betamethasone acetate-betamethasone sodium phosphate injection 9 mg  -     doxycycline (MONODOX) 100 MG capsule; Take 1 capsule (100 mg total) by mouth every 12 (twelve) hours. for 10 days  Dispense: 20 capsule; Refill: 0    Drink plenty of fluids.     Get plenty of rest.     Tylenol or Motrin as needed.     Go to the ER with any significant change or worsening of symptoms.     Follow up with your primary care doctor.                        [1]   Social History  Tobacco Use    Smoking status: Former     Current packs/day: 0.00     Average packs/day: 0.5 packs/day for 2.0 years (1.0 ttl pk-yrs)     Types: Cigarettes     Start date: 2008     Quit date: 2010     Years since quitting: 15.2    Smokeless tobacco: Current     Types: Chew    Tobacco comments:     Current - tobacco   Substance Use Topics     Alcohol use: Yes     Alcohol/week: 3.0 - 5.0 standard drinks of alcohol     Types: 3 - 5 Drinks containing 0.5 oz of alcohol per week    Drug use: Never

## 2025-03-22 ENCOUNTER — TELEPHONE (OUTPATIENT)
Dept: PHARMACY | Facility: CLINIC | Age: 40
End: 2025-03-22
Payer: COMMERCIAL

## 2025-03-22 NOTE — TELEPHONE ENCOUNTER
[facility]Population [facility] Chart Review & Patient Outreach Details For Medication Adherence Project    Reason for Outreach Encounter: 3rd Party payor non-compliance report ([employer], [employer], [employer], etc)  2.  Patient Outreach Method: Reviewed patient chart   3.   Medication in question:    Hyperlipidemia Medications              rosuvastatin (CRESTOR) 20 MG tablet TAKE 1 TABLET BY MOUTH ONCE DAILY IN THE EVENING                  crestor  last filled  2/21 for 90 day supply      4.  Reviewed and or Updates Made To: Patient Chart  5. Outreach Outcomes and/or actions taken: Patient filled medication and is on track to be adherent  Additional Notes:

## 2025-04-25 ENCOUNTER — TELEPHONE (OUTPATIENT)
Dept: PHARMACY | Facility: CLINIC | Age: 40
End: 2025-04-25
Payer: COMMERCIAL

## 2025-04-25 NOTE — TELEPHONE ENCOUNTER
[facility]Population [facility] Chart Review & Patient Outreach Details For Medication Adherence Project    Reason for Outreach Encounter: 3rd Party payor non-compliance report ([employer], [employer], [employer], etc)  2.  Patient Outreach Method: Reviewed patient chart   3.   Medication in question:    Hyperlipidemia Medications              rosuvastatin (CRESTOR) 20 MG tablet TAKE 1 TABLET BY MOUTH ONCE DAILY IN THE EVENING                  4.  Reviewed and or Updates Made To: Patient Chart  5. Outreach Outcomes and/or actions taken: Other  Additional Notes:

## 2025-07-14 PROBLEM — E78.00 HYPERCHOLESTEROLEMIA: Status: ACTIVE | Noted: 2025-07-14

## 2025-07-15 ENCOUNTER — TELEPHONE (OUTPATIENT)
Dept: PHARMACY | Facility: CLINIC | Age: 40
End: 2025-07-15
Payer: COMMERCIAL

## 2025-07-16 NOTE — TELEPHONE ENCOUNTER
[facility] Chart Review & Patient Outreach Details For Medication Adherence Project    Reason for Outreach Encounter: 3rd Party payor non-compliance report ([employer], [employer], C, etc)  2.  Patient Outreach Method: Reviewed Patient Chart  3.   Medication in question: Rosuvastatin 20mg   LAST FILLED: 5/20/25 for 90 day supply  Hyperlipidemia Medications              rosuvastatin (CRESTOR) 20 MG tablet Take 1 tablet (20 mg total) by mouth every evening.               4.  Reviewed and or Updates Made To: Patient Chart  5. Outreach Outcomes and/or actions taken: Patient filled medication and is on track to be adherent

## (undated) DEVICE — GLOVE PROTEXIS LTX MICRO  7

## (undated) DEVICE — SOL NACL IRR 1000ML BTL

## (undated) DEVICE — GLOVE PROTEXIS LTX MICRO 6

## (undated) DEVICE — KIT ANTIFOG W/SPONG & FLUID

## (undated) DEVICE — DRESSING NASOPORE 8CM BIORSRBL

## (undated) DEVICE — SUT 5-0 CHROMIC GUT / P-3

## (undated) DEVICE — NDL SPINAL 22GA 3.5 IN QUINCKE

## (undated) DEVICE — SET TUBE (1) SMARTSITE PRT 104

## (undated) DEVICE — GOWN X-LG STERILE BACK

## (undated) DEVICE — GLOVE PROTEXIS BLUE LATEX 7

## (undated) DEVICE — GLOVE PROTEXIS LTX MICRO 6.5

## (undated) DEVICE — SOL NORMAL USPCA 0.9%

## (undated) DEVICE — SUPPORT ULNA NERVE PROTECTOR

## (undated) DEVICE — TUBE SUCTION MEDI-VAC STERILE

## (undated) DEVICE — SEE MEDLINE ITEM 157059

## (undated) DEVICE — SUT PLN GUT 4-0 SC-1SC-1 1